# Patient Record
Sex: MALE | Race: WHITE | NOT HISPANIC OR LATINO
[De-identification: names, ages, dates, MRNs, and addresses within clinical notes are randomized per-mention and may not be internally consistent; named-entity substitution may affect disease eponyms.]

---

## 2020-07-07 ENCOUNTER — TRANSCRIPTION ENCOUNTER (OUTPATIENT)
Age: 60
End: 2020-07-07

## 2020-10-12 ENCOUNTER — TRANSCRIPTION ENCOUNTER (OUTPATIENT)
Age: 60
End: 2020-10-12

## 2021-04-13 ENCOUNTER — TRANSCRIPTION ENCOUNTER (OUTPATIENT)
Age: 61
End: 2021-04-13

## 2021-04-15 ENCOUNTER — TRANSCRIPTION ENCOUNTER (OUTPATIENT)
Age: 61
End: 2021-04-15

## 2022-02-02 ENCOUNTER — TRANSCRIPTION ENCOUNTER (OUTPATIENT)
Age: 62
End: 2022-02-02

## 2022-10-19 ENCOUNTER — NON-APPOINTMENT (OUTPATIENT)
Age: 62
End: 2022-10-19

## 2022-10-24 ENCOUNTER — EMERGENCY (EMERGENCY)
Facility: HOSPITAL | Age: 62
LOS: 1 days | Discharge: HOME | End: 2022-10-24
Attending: STUDENT IN AN ORGANIZED HEALTH CARE EDUCATION/TRAINING PROGRAM | Admitting: STUDENT IN AN ORGANIZED HEALTH CARE EDUCATION/TRAINING PROGRAM
Payer: COMMERCIAL

## 2022-10-24 VITALS
HEART RATE: 94 BPM | RESPIRATION RATE: 18 BRPM | TEMPERATURE: 98 F | WEIGHT: 199.96 LBS | SYSTOLIC BLOOD PRESSURE: 154 MMHG | OXYGEN SATURATION: 96 % | DIASTOLIC BLOOD PRESSURE: 105 MMHG

## 2022-10-24 VITALS
RESPIRATION RATE: 16 BRPM | DIASTOLIC BLOOD PRESSURE: 94 MMHG | OXYGEN SATURATION: 97 % | HEART RATE: 81 BPM | SYSTOLIC BLOOD PRESSURE: 151 MMHG

## 2022-10-24 LAB
ALBUMIN SERPL ELPH-MCNC: 3.8 G/DL — SIGNIFICANT CHANGE UP (ref 3.3–5)
ALP SERPL-CCNC: 91 U/L — SIGNIFICANT CHANGE UP (ref 40–120)
ALT FLD-CCNC: 39 U/L — SIGNIFICANT CHANGE UP (ref 10–45)
ANION GAP SERPL CALC-SCNC: 5 MMOL/L — SIGNIFICANT CHANGE UP (ref 5–17)
APPEARANCE UR: CLEAR — SIGNIFICANT CHANGE UP
AST SERPL-CCNC: 13 U/L — SIGNIFICANT CHANGE UP (ref 10–40)
BACTERIA # UR AUTO: ABNORMAL /HPF
BASOPHILS # BLD AUTO: 0.05 K/UL — SIGNIFICANT CHANGE UP (ref 0–0.2)
BASOPHILS NFR BLD AUTO: 0.5 % — SIGNIFICANT CHANGE UP (ref 0–2)
BILIRUB SERPL-MCNC: 0.3 MG/DL — SIGNIFICANT CHANGE UP (ref 0.2–1.2)
BILIRUB UR-MCNC: NEGATIVE — SIGNIFICANT CHANGE UP
BUN SERPL-MCNC: 18 MG/DL — SIGNIFICANT CHANGE UP (ref 7–23)
CALCIUM SERPL-MCNC: 9 MG/DL — SIGNIFICANT CHANGE UP (ref 8.4–10.5)
CHLORIDE SERPL-SCNC: 102 MMOL/L — SIGNIFICANT CHANGE UP (ref 96–108)
CO2 SERPL-SCNC: 28 MMOL/L — SIGNIFICANT CHANGE UP (ref 22–31)
COLOR SPEC: YELLOW — SIGNIFICANT CHANGE UP
CREAT SERPL-MCNC: 1.43 MG/DL — HIGH (ref 0.5–1.3)
DIFF PNL FLD: ABNORMAL
EGFR: 56 ML/MIN/1.73M2 — LOW
EOSINOPHIL # BLD AUTO: 0.28 K/UL — SIGNIFICANT CHANGE UP (ref 0–0.5)
EOSINOPHIL NFR BLD AUTO: 2.9 % — SIGNIFICANT CHANGE UP (ref 0–6)
EPI CELLS # UR: SIGNIFICANT CHANGE UP
GLUCOSE SERPL-MCNC: 134 MG/DL — HIGH (ref 70–99)
GLUCOSE UR QL: NEGATIVE — SIGNIFICANT CHANGE UP
HCT VFR BLD CALC: 41.7 % — SIGNIFICANT CHANGE UP (ref 39–50)
HGB BLD-MCNC: 14.8 G/DL — SIGNIFICANT CHANGE UP (ref 13–17)
IMM GRANULOCYTES NFR BLD AUTO: 0.4 % — SIGNIFICANT CHANGE UP (ref 0–0.9)
KETONES UR-MCNC: NEGATIVE — SIGNIFICANT CHANGE UP
LEUKOCYTE ESTERASE UR-ACNC: ABNORMAL
LYMPHOCYTES # BLD AUTO: 1.6 K/UL — SIGNIFICANT CHANGE UP (ref 1–3.3)
LYMPHOCYTES # BLD AUTO: 16.6 % — SIGNIFICANT CHANGE UP (ref 13–44)
MCHC RBC-ENTMCNC: 31.7 PG — SIGNIFICANT CHANGE UP (ref 27–34)
MCHC RBC-ENTMCNC: 35.5 GM/DL — SIGNIFICANT CHANGE UP (ref 32–36)
MCV RBC AUTO: 89.3 FL — SIGNIFICANT CHANGE UP (ref 80–100)
MONOCYTES # BLD AUTO: 0.54 K/UL — SIGNIFICANT CHANGE UP (ref 0–0.9)
MONOCYTES NFR BLD AUTO: 5.6 % — SIGNIFICANT CHANGE UP (ref 2–14)
NEUTROPHILS # BLD AUTO: 7.11 K/UL — SIGNIFICANT CHANGE UP (ref 1.8–7.4)
NEUTROPHILS NFR BLD AUTO: 74 % — SIGNIFICANT CHANGE UP (ref 43–77)
NITRITE UR-MCNC: NEGATIVE — SIGNIFICANT CHANGE UP
NRBC # BLD: 0 /100 WBCS — SIGNIFICANT CHANGE UP (ref 0–0)
PH UR: 6 — SIGNIFICANT CHANGE UP (ref 5–8)
PLATELET # BLD AUTO: 206 K/UL — SIGNIFICANT CHANGE UP (ref 150–400)
POTASSIUM SERPL-MCNC: 4.2 MMOL/L — SIGNIFICANT CHANGE UP (ref 3.5–5.3)
POTASSIUM SERPL-SCNC: 4.2 MMOL/L — SIGNIFICANT CHANGE UP (ref 3.5–5.3)
PROT SERPL-MCNC: 6.6 G/DL — SIGNIFICANT CHANGE UP (ref 6–8.3)
PROT UR-MCNC: 15
RBC # BLD: 4.67 M/UL — SIGNIFICANT CHANGE UP (ref 4.2–5.8)
RBC # FLD: 12.2 % — SIGNIFICANT CHANGE UP (ref 10.3–14.5)
RBC CASTS # UR COMP ASSIST: ABNORMAL /HPF (ref 0–4)
SODIUM SERPL-SCNC: 135 MMOL/L — SIGNIFICANT CHANGE UP (ref 135–145)
SP GR SPEC: 1.01 — SIGNIFICANT CHANGE UP (ref 1.01–1.02)
UROBILINOGEN FLD QL: NEGATIVE — SIGNIFICANT CHANGE UP
WBC # BLD: 9.62 K/UL — SIGNIFICANT CHANGE UP (ref 3.8–10.5)
WBC # FLD AUTO: 9.62 K/UL — SIGNIFICANT CHANGE UP (ref 3.8–10.5)
WBC UR QL: SIGNIFICANT CHANGE UP /HPF (ref 0–5)

## 2022-10-24 PROCEDURE — 87086 URINE CULTURE/COLONY COUNT: CPT

## 2022-10-24 PROCEDURE — 80053 COMPREHEN METABOLIC PANEL: CPT

## 2022-10-24 PROCEDURE — 36415 COLL VENOUS BLD VENIPUNCTURE: CPT

## 2022-10-24 PROCEDURE — 51702 INSERT TEMP BLADDER CATH: CPT

## 2022-10-24 PROCEDURE — 99283 EMERGENCY DEPT VISIT LOW MDM: CPT

## 2022-10-24 PROCEDURE — 85025 COMPLETE CBC W/AUTO DIFF WBC: CPT

## 2022-10-24 PROCEDURE — 81001 URINALYSIS AUTO W/SCOPE: CPT

## 2022-10-24 PROCEDURE — 99284 EMERGENCY DEPT VISIT MOD MDM: CPT

## 2022-10-24 NOTE — ED PROVIDER NOTE - NSFOLLOWUPINSTRUCTIONS_ED_ALL_ED_FT
Urinary Retention in Men    WHAT YOU NEED TO KNOW:    What is urinary retention? Urinary retention is a condition that develops when your bladder does not empty completely when you urinate.         What causes urinary retention?     An enlarged prostate      Blockages, such as a stone, growth, or narrowing of your urethra      A weak bladder muscle      Nerve damage from diabetes, stroke, or spinal cord injury      Bladder diverticula, which are pockets of urine that form in your bladder and do not empty      Certain medicines, such as narcotics, antihistamines, or antidepressants    What are the signs and symptoms of urinary retention?     Frequent urination, or the urge to urinate right after you finish      An urge to urinate, but your urine does not come out or dribbles out slowly and weakly      Frequent urine leaks that happen during the day or while you sleep      Pain or pressure when you urinate      Pain or stiffness in your abdomen, lower back, hips, or upper thighs      Blood in your urine    How is urinary retention diagnosed? Your healthcare provider will ask about your health history and the medicines you take. He will press or tap on your lower abdomen. You may need any of the following tests:     A digital rectal exam is when healthcare providers carefully feel the size of your prostate.      A post void residual test will show how much urine is left in your bladder after you urinate. You will be asked to urinate and then healthcare providers will use a small ultrasound machine to check how much urine is left in your bladder.      Blood or urine tests may show infection or prostate specific antigen (PSA) levels. PSA may be elevated in prostate cancer.      An ultrasound uses sound waves to show pictures on a monitor. An ultrasound may be done to show bladder stones, infection, or other problems.      A CT scan, or CAT scan, is a type of x-ray that is taken of your prostate, kidneys, and bladder. The pictures may show what is causing your urinary retention. You may be given a dye before the pictures are taken to help healthcare providers see the pictures better. Tell the healthcare provider if you have ever had an allergic reaction to contrast dye.    How is urinary retention treated?     A Zimmerman catheter is a tube put into your bladder to drain urine into a bag. Keep the bag below your waist. This will prevent urine from flowing back into your bladder and causing an infection or other problems. Also, keep the tube free of kinks so the urine will drain properly. Do not pull on the catheter. This can cause pain and bleeding, and may cause the catheter to come out.       Medicines can help decrease the size of your prostate, fight infection, and help you urinate more easily.      Surgery may be needed to treat the condition that is causing your urinary retention.     When should I contact my healthcare provider?     You have a fever.      You have pain when you urinate.      You have blood in your urine.      You have problems with your catheter.      You have questions or concerns about your condition or care.    When should I seek immediate care or call 911?     You have severe abdominal pain.      You are breathing faster than usual.      Your heartbeat is faster than usual.      Your face, hands, feet, or ankles are swollen.     CARE AGREEMENT:    You have the right to help plan your care. Learn about your health condition and how it may be treated. Discuss treatment options with your healthcare providers to decide what care you want to receive. You always have the right to refuse treatment. FOLLOW UP WITH YOUR UROLOGIST  Any worsening of symptoms or new concerning symptoms return to the ED     Urinary Retention in Men    WHAT YOU NEED TO KNOW:    What is urinary retention? Urinary retention is a condition that develops when your bladder does not empty completely when you urinate.         What causes urinary retention?     An enlarged prostate      Blockages, such as a stone, growth, or narrowing of your urethra      A weak bladder muscle      Nerve damage from diabetes, stroke, or spinal cord injury      Bladder diverticula, which are pockets of urine that form in your bladder and do not empty      Certain medicines, such as narcotics, antihistamines, or antidepressants    What are the signs and symptoms of urinary retention?     Frequent urination, or the urge to urinate right after you finish      An urge to urinate, but your urine does not come out or dribbles out slowly and weakly      Frequent urine leaks that happen during the day or while you sleep      Pain or pressure when you urinate      Pain or stiffness in your abdomen, lower back, hips, or upper thighs      Blood in your urine    How is urinary retention diagnosed? Your healthcare provider will ask about your health history and the medicines you take. He will press or tap on your lower abdomen. You may need any of the following tests:     A digital rectal exam is when healthcare providers carefully feel the size of your prostate.      A post void residual test will show how much urine is left in your bladder after you urinate. You will be asked to urinate and then healthcare providers will use a small ultrasound machine to check how much urine is left in your bladder.      Blood or urine tests may show infection or prostate specific antigen (PSA) levels. PSA may be elevated in prostate cancer.      An ultrasound uses sound waves to show pictures on a monitor. An ultrasound may be done to show bladder stones, infection, or other problems.      A CT scan, or CAT scan, is a type of x-ray that is taken of your prostate, kidneys, and bladder. The pictures may show what is causing your urinary retention. You may be given a dye before the pictures are taken to help healthcare providers see the pictures better. Tell the healthcare provider if you have ever had an allergic reaction to contrast dye.    How is urinary retention treated?     A Zimmerman catheter is a tube put into your bladder to drain urine into a bag. Keep the bag below your waist. This will prevent urine from flowing back into your bladder and causing an infection or other problems. Also, keep the tube free of kinks so the urine will drain properly. Do not pull on the catheter. This can cause pain and bleeding, and may cause the catheter to come out.       Medicines can help decrease the size of your prostate, fight infection, and help you urinate more easily.      Surgery may be needed to treat the condition that is causing your urinary retention.     When should I contact my healthcare provider?     You have a fever.      You have pain when you urinate.      You have blood in your urine.      You have problems with your catheter.      You have questions or concerns about your condition or care.    When should I seek immediate care or call 911?     You have severe abdominal pain.      You are breathing faster than usual.      Your heartbeat is faster than usual.      Your face, hands, feet, or ankles are swollen.     CARE AGREEMENT:    You have the right to help plan your care. Learn about your health condition and how it may be treated. Discuss treatment options with your healthcare providers to decide what care you want to receive. You always have the right to refuse treatment.

## 2022-10-24 NOTE — ED PROVIDER NOTE - CLINICAL SUMMARY MEDICAL DECISION MAKING FREE TEXT BOX
61 yo male with hx of BPH; presents for urinary retention s/p moy being removed today- inability to urinate since that time; will place moy catheter- bladder scan >500cc; urine; labs; and re-eval 63 yo male with hx of BPH; presents for urinary retention s/p moy being removed today- inability to urinate since that time; will place moy catheter- bladder scan >500cc; urine; labs; and re-eval  creatinine 1.4 from 1.1  bp recheck and dc home  Discussed with patient need to return to ED if symptoms don't continue to improve or recur or develops any new or worsening symptoms that are of concern.

## 2022-10-24 NOTE — ED PROVIDER NOTE - ATTENDING APP SHARED VISIT CONTRIBUTION OF CARE
I, Blanca Stoddard DO, personally saw the patient with ACP.  I have personally performed a face to face diagnostic evaluation on this patient.  I have reviewed the ACP note and agree with the history, exam, and plan of care, except as noted.  I personally saw the patient and performed a substantive portion of the visit including all aspects of the medical decision making. I, Blanca Stoddard DO,  performed the initial face to face bedside interview with this patient regarding history of present illness, review of symptoms and relevant past medical, social and family history.  I completed an independent physical examination.  I was the initial provider who evaluated this patient.   I personally saw the patient and performed a substantive portion of the visit including all aspects of the medical decision making.  I have signed out the follow up of any pending tests (i.e. labs, radiological studies) to the ACP.  I have communicated the patient’s plan of care and disposition with the ACP.

## 2022-10-24 NOTE — ED ADULT TRIAGE NOTE - CHIEF COMPLAINT QUOTE
Patient presents to ED from home complaining of urinary retention due to enlarged prostate. Patient was seen at urologists office today, moy catheter removed at 12 noon (had been in for 4 days), patient has only been able to urinate a very small amount since without steady stream.

## 2022-10-24 NOTE — ED PROVIDER NOTE - PATIENT PORTAL LINK FT
You can access the FollowMyHealth Patient Portal offered by [facility] by registering at the following website: [email or web address]. By joining [facility]’s FollowMyHealth portal, you will also be able to view your health information using other applications (apps) compatible with our system.

## 2022-10-24 NOTE — ED PROVIDER NOTE - OBJECTIVE STATEMENT
Patient is a 63 yo male presenting with urinary retention; had moy removed at noon today by private urologist; and has been unable to urinate since that time; reports moy was placed 2/2 to hx of BPH; due to have prostate surgery in 1 month; no other complaints.

## 2022-10-24 NOTE — ED ADULT NURSE NOTE - OBJECTIVE STATEMENT
Zimmerman catheter removed 12 noon, only small amounts of urine passed. Bladder scan showed, 514 ml.

## 2022-10-25 LAB
CULTURE RESULTS: NO GROWTH — SIGNIFICANT CHANGE UP
SPECIMEN SOURCE: SIGNIFICANT CHANGE UP